# Patient Record
Sex: FEMALE | Race: WHITE | Employment: UNEMPLOYED | URBAN - METROPOLITAN AREA
[De-identification: names, ages, dates, MRNs, and addresses within clinical notes are randomized per-mention and may not be internally consistent; named-entity substitution may affect disease eponyms.]

---

## 2024-06-27 ENCOUNTER — HOSPITAL ENCOUNTER (OUTPATIENT)
Dept: VASCULAR LAB | Age: 69
Discharge: HOME | End: 2024-06-29
Payer: MEDICARE

## 2024-06-27 DIAGNOSIS — I87.303 STASIS EDEMA OF BOTH LOWER EXTREMITIES: ICD-10-CM

## 2024-06-27 DIAGNOSIS — L97.211 VENOUS STASIS ULCER OF RIGHT CALF LIMITED TO BREAKDOWN OF SKIN WITH VARICOSE VEINS (HCC): ICD-10-CM

## 2024-06-27 DIAGNOSIS — I83.012 VENOUS STASIS ULCER OF RIGHT CALF LIMITED TO BREAKDOWN OF SKIN WITH VARICOSE VEINS (HCC): ICD-10-CM

## 2024-06-27 LAB
VAS LEFT GSV AT KNEE DIAM: 3.2 MM
VAS LEFT GSV BK DIST DIAM: 2.8 MM
VAS LEFT GSV BK MID DIAM: 2.6 MM
VAS LEFT GSV BK PROX DIAM: 3 MM
VAS LEFT GSV JUNC DIAM: 9.4 MM
VAS LEFT GSV JUNC RFX: 0.5 S
VAS LEFT GSV THIGH DIST DIAM: 3.5 MM
VAS LEFT GSV THIGH MID DIAM: 4.8 MM
VAS LEFT GSV THIGH PROX DIAM: 4.7 MM
VAS LEFT PERFORATOR 2 DIAM: 2.1 MM
VAS LEFT PERFORATOR DIAM: 2.1 MM
VAS LEFT SSV MID DIAM: 2.3 MM
VAS LEFT SSV PROX DIAM: 2.7 MM
VAS RIGHT GSV AT KNEE DIAM: 4.5 MM
VAS RIGHT GSV BK DIST DIAM: 2.5 MM
VAS RIGHT GSV BK MID DIAM: 2.1 MM
VAS RIGHT GSV BK MID RFX: 1.5 S
VAS RIGHT GSV BK PROX DIAM: 4.3 MM
VAS RIGHT GSV JUNC DIAM: 8.6 MM
VAS RIGHT GSV JUNC RFX: 0.8 S
VAS RIGHT GSV THIGH DIST DIAM: 4 MM
VAS RIGHT GSV THIGH MID DIAM: 5.4 MM
VAS RIGHT GSV THIGH PROX DIAM: 6.2 MM
VAS RIGHT PERFORATOR 2 DIAM: 3.4 MM
VAS RIGHT PERFORATOR DIAM: 2.1 MM
VAS RIGHT SSV MID DIAM: 2.7 MM
VAS RIGHT SSV PROX DIAM: 3.5 MM

## 2024-06-27 PROCEDURE — 93970 EXTREMITY STUDY: CPT

## 2024-07-11 ENCOUNTER — TELEPHONE (OUTPATIENT)
Dept: CARDIOLOGY CLINIC | Age: 69
End: 2024-07-11

## 2024-07-11 NOTE — TELEPHONE ENCOUNTER
Patient new referral from Dr. Beavers for venous insufficiency.  Please call patient and offer new patient appt with Dr. Flores in [place] 8/14/24 at 830 am.  Thanks

## 2024-07-11 NOTE — TELEPHONE ENCOUNTER
Frieda states she is unfamiliar [facility] and will give us a call when she is ready to move forward. I did make her aware of the other locations  travels to.